# Patient Record
Sex: FEMALE | Race: WHITE | NOT HISPANIC OR LATINO | URBAN - NONMETROPOLITAN AREA
[De-identification: names, ages, dates, MRNs, and addresses within clinical notes are randomized per-mention and may not be internally consistent; named-entity substitution may affect disease eponyms.]

---

## 2022-11-14 ENCOUNTER — APPOINTMENT (RX ONLY)
Dept: URBAN - NONMETROPOLITAN AREA CLINIC 1 | Facility: CLINIC | Age: 57
Setting detail: DERMATOLOGY
End: 2022-11-14

## 2022-11-14 DIAGNOSIS — L20.89 OTHER ATOPIC DERMATITIS: ICD-10-CM | Status: INADEQUATELY CONTROLLED

## 2022-11-14 PROCEDURE — ? PRESCRIPTION

## 2022-11-14 PROCEDURE — ? PRESCRIPTION MEDICATION MANAGEMENT

## 2022-11-14 PROCEDURE — 99204 OFFICE O/P NEW MOD 45 MIN: CPT

## 2022-11-14 PROCEDURE — ? ORDER TESTS

## 2022-11-14 PROCEDURE — ? COUNSELING

## 2022-11-14 PROCEDURE — ? MEDICATION COUNSELING

## 2022-11-14 PROCEDURE — ? CIBINQO INITIATION

## 2022-11-14 RX ORDER — TRIAMCINOLONE ACETONIDE 1 MG/G
OINTMENT TOPICAL AS DIRECTED
Qty: 454 | Refills: 1 | Status: ERX | COMMUNITY
Start: 2022-11-14

## 2022-11-14 RX ADMIN — TRIAMCINOLONE ACETONIDE: 1 OINTMENT TOPICAL at 00:00

## 2022-11-14 ASSESSMENT — LOCATION ZONE DERM: LOCATION ZONE: LEG

## 2022-11-14 ASSESSMENT — LOCATION SIMPLE DESCRIPTION DERM
LOCATION SIMPLE: RIGHT PRETIBIAL REGION
LOCATION SIMPLE: LEFT PRETIBIAL REGION

## 2022-11-14 ASSESSMENT — LOCATION DETAILED DESCRIPTION DERM
LOCATION DETAILED: LEFT PROXIMAL PRETIBIAL REGION
LOCATION DETAILED: RIGHT DISTAL PRETIBIAL REGION

## 2022-11-14 NOTE — PROCEDURE: CIBINQO INITIATION
Cibinqo Monitoring Guidelines: CBC, lipids, hepatitis screening, and TB screening should be checked prior to initiating Cibinqo therapy.  Labs may also be checked periodically after the initiation period.
Detail Level: Zone
Is Soriatane Contraindicated?: No
Cibinqo Dosing: 100mg Daily
Diagnosis (Required): Atopic Dermatitis/Eczematous Dermatitis
Pregnancy And Lactation Warning Text: It is unknown if this medication will adversely affect pregnancy or breast feeding.  You should not take this medication if you are currently pregnant or planning a pregnancy or while breastfeeding.

## 2022-11-14 NOTE — PROCEDURE: PRESCRIPTION MEDICATION MANAGEMENT
Initiate Treatment: Cibinqo 100mg QD\\nTriamcinolone every third day
Discontinue Regimen: Methotrexate \\nHydrocortisone 2.5%
Detail Level: Zone
Render In Strict Bullet Format?: No
Plan: Advised patient to moisturize several times daily

## 2022-11-14 NOTE — PROCEDURE: MEDICATION COUNSELING
Jayce Antony   MRN: [id number]    Department:  [facility] Emergency Department   Date of Visit:  9/23/2017           Disclosure     Insurance plans vary and the physician(s) referred by the ER may not be covered by your plan.  Please contact CARE PHYSICIAN AT ONCE OR RETURN IMMEDIATELY TO THE EMERGENCY DEPARTMENT. If you have been prescribed any medication(s), please fill your prescription right away and begin taking the medication(s) as directed.   If you believe that any of the medications Clindamycin Pregnancy And Lactation Text: This medication can be used in pregnancy if certain situations. Clindamycin is also present in breast milk.

## 2022-11-14 NOTE — HPI: RASH
What Type Of Note Output Would You Prefer (Optional)?: Bullet Format
How Severe Is Your Rash?: mild
Is This A New Presentation, Or A Follow-Up?: Referral for Rash
Additional History: Patient has had an on and off again for 10-15 years and tried several biologics. She states with the dupixent she was having facial swelling, but that seemed to help the best. She has been evaluated by the rheumatologist and given diagnosis of RA and PA. Now that it has not been clearing up she wanted her to be evaluated by Derm. Patient states that prednisone helps clear up the rash more than anything and she is on hydrocortisone 2.5%

## 2022-11-22 ENCOUNTER — APPOINTMENT (RX ONLY)
Dept: URBAN - NONMETROPOLITAN AREA CLINIC 1 | Facility: CLINIC | Age: 57
Setting detail: DERMATOLOGY
End: 2022-11-22

## 2022-11-22 DIAGNOSIS — Z79.899 OTHER LONG TERM (CURRENT) DRUG THERAPY: ICD-10-CM

## 2022-11-22 PROCEDURE — ? COUNSELING

## 2022-11-22 PROCEDURE — ? ORDER TESTS

## 2022-11-22 NOTE — PROCEDURE: ORDER TESTS
Expected Date Of Service: 11/22/2022
Performing Laboratory: 0
Bill For Surgical Tray: no
Billing Type: Third-Party Bill